# Patient Record
Sex: FEMALE | Race: WHITE | NOT HISPANIC OR LATINO | Employment: UNEMPLOYED | URBAN - METROPOLITAN AREA
[De-identification: names, ages, dates, MRNs, and addresses within clinical notes are randomized per-mention and may not be internally consistent; named-entity substitution may affect disease eponyms.]

---

## 2021-05-12 PROBLEM — D68.52 PROTHROMBIN GENE MUTATION (HCC): Status: ACTIVE | Noted: 2021-05-12

## 2021-05-12 NOTE — PROGRESS NOTES
05/21/21    Subjective    Chief Complaint:  Prothrombin Gene mutation    HPI:  45 female referred by Dr. Stella Zhu for a Prothrombin 94622E mutation. Testing was done because of a positive family history. She is a heterozygote for the prothrombin gene. Factor V Leiden is negative, protein C and S normal. All other thrombophilia lab was negative. 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 She had no clotting issues during her pregnancy.     ROS:    Constitutional: No weight loss  Skin: No rash or jaundice  HENT: No change in eyesight or hearing  Cardiovascular:No chest pain or arrythmia  Respiratory:No cough or SOB  GI:No nausea, vomiting, diarrhea, constipation  :No dysuria or frequency  Musculoskeletal:No bone or joint pain  Neuro:No sx's of neuropathy  Psych: No complaints    PMH:      No Known Allergies    History reviewed. No pertinent past medical history.     History reviewed. No pertinent surgical history.     Medications:    Current Outpatient Medications on File Prior to Visit   Medication Sig Dispense Refill   • allopurinol (ZYLOPRIM) 300 MG Tab 300 mg every day.     • atorvastatin (LIPITOR) 20 MG Tab 20 mg every day.     • busPIRone (BUSPAR) 30 MG tablet Take 30 mg by mouth.     • gabapentin (NEURONTIN) 600 MG tablet Take 600 mg by mouth 3 times a day.     • glipiZIDE (GLUCOTROL) 10 MG Tab Take 10 mg by mouth.     • lisinopril (PRINIVIL) 10 MG Tab 10 mg.     • montelukast (SINGULAIR) 10 MG Tab 10 mg every day.     • sertraline (ZOLOFT) 100 MG Tab 100 mg every day.     • JANUVIA 100 MG Tab 100 mg every day.     • traZODone (DESYREL) 150 MG Tab 150 mg every day.       No current facility-administered medications on file prior to visit.       Social History     Tobacco Use   • Smoking status: Not on file   Substance Use Topics   • Alcohol use: Not on file        History reviewed. No pertinent family history.     Objective    Vitals:    /78 (BP  "Location: Right arm, Patient Position: Sitting, BP Cuff Size: Adult)   Pulse 97   Temp 36.1 °C (96.9 °F) (Temporal)   Resp 16   Ht 1.753 m (5' 9\")   Wt 107 kg (236 lb 5.3 oz)   SpO2 96%   BMI 34.90 kg/m²     Physical Exam:    Appears well-developed, obese, no distress.    Head -  Normocephalic .   Eyes - Pupils are equal. Conjunctivae normal. No scleral icterus.   Ears - normal hearing  Neck - Normal range of motion. Neck supple. No thyromegaly  Cardiovascular - Normal rate, regular rhythm, normal heart sounds and intact distal pulses. No  gallop, murmur or rub  Pulmonary - Normal breath sounds.  No wheeze, rales or rhonci  Breast - Not examined  Abdominal -Soft. Obese.  No distension, tenderness, organomegaly or mass  Extremities-  No edema or tenderness.    Nodes - No submental, submandibular, preauricular, cervical, axillary or inguinal adenopathy.    Neurological -   Alert and oriented  Skin - Skin is warm and dry. No rash noted. Not diaphoretic. No erythema. No pallor. No jaundice   Psychiatric -  Normal mood and affect.    Labs:    See PI and media tab  Assessment    Imp:    Visit Diagnosis:    1. Prothrombin gene mutation (HCC)           Plan:  Discussed risk factor for clotting including long rides, smoking, overweight, pregnancy. If develops an unprovoked clot will need lifelong anticoagulation. We compromised and she will start ASA 81 mg daily. Told to go to ER stat if chest pain or SOB    Yuan Butcher M.D.        "

## 2021-07-01 ENCOUNTER — OFFICE VISIT (OUTPATIENT)
Dept: HEMATOLOGY ONCOLOGY | Facility: MEDICAL CENTER | Age: 46
End: 2021-07-01
Payer: MEDICAID

## 2021-07-01 VITALS
HEART RATE: 97 BPM | SYSTOLIC BLOOD PRESSURE: 120 MMHG | OXYGEN SATURATION: 96 % | WEIGHT: 236.33 LBS | RESPIRATION RATE: 16 BRPM | DIASTOLIC BLOOD PRESSURE: 78 MMHG | TEMPERATURE: 96.9 F | BODY MASS INDEX: 35 KG/M2 | HEIGHT: 69 IN

## 2021-07-01 DIAGNOSIS — D68.52 PROTHROMBIN GENE MUTATION (HCC): ICD-10-CM

## 2021-07-01 PROCEDURE — 99204 OFFICE O/P NEW MOD 45 MIN: CPT | Performed by: INTERNAL MEDICINE

## 2021-07-01 RX ORDER — GABAPENTIN 600 MG/1
600 TABLET ORAL 3 TIMES DAILY
COMMUNITY
Start: 2021-06-24

## 2021-07-01 RX ORDER — MONTELUKAST SODIUM 10 MG/1
10 TABLET ORAL DAILY
COMMUNITY
Start: 2021-06-07

## 2021-07-01 RX ORDER — LISINOPRIL 10 MG/1
10 TABLET ORAL
COMMUNITY
Start: 2021-06-07

## 2021-07-01 RX ORDER — ALLOPURINOL 300 MG/1
300 TABLET ORAL DAILY
COMMUNITY
Start: 2021-06-07

## 2021-07-01 RX ORDER — BUSPIRONE HYDROCHLORIDE 30 MG/1
30 TABLET ORAL
COMMUNITY
Start: 2021-06-12

## 2021-07-01 RX ORDER — SERTRALINE HYDROCHLORIDE 100 MG/1
100 TABLET, FILM COATED ORAL DAILY
COMMUNITY
Start: 2021-06-30

## 2021-07-01 RX ORDER — GLIPIZIDE 10 MG/1
10 TABLET ORAL
COMMUNITY
Start: 2021-06-07

## 2021-07-01 RX ORDER — TRAZODONE HYDROCHLORIDE 150 MG/1
150 TABLET ORAL DAILY
COMMUNITY
Start: 2021-06-07

## 2021-07-01 RX ORDER — ATORVASTATIN CALCIUM 20 MG/1
20 TABLET, FILM COATED ORAL DAILY
COMMUNITY
Start: 2021-06-24

## 2021-07-01 RX ORDER — SITAGLIPTIN 100 MG/1
100 TABLET, FILM COATED ORAL DAILY
COMMUNITY
Start: 2021-06-24

## 2021-07-01 ASSESSMENT — PAIN SCALES - GENERAL: PAINLEVEL: NO PAIN

## 2021-07-01 ASSESSMENT — PATIENT HEALTH QUESTIONNAIRE - PHQ9: CLINICAL INTERPRETATION OF PHQ2 SCORE: 0
